# Patient Record
Sex: FEMALE | Race: BLACK OR AFRICAN AMERICAN | NOT HISPANIC OR LATINO
[De-identification: names, ages, dates, MRNs, and addresses within clinical notes are randomized per-mention and may not be internally consistent; named-entity substitution may affect disease eponyms.]

---

## 2017-08-14 ENCOUNTER — APPOINTMENT (OUTPATIENT)
Dept: ULTRASOUND IMAGING | Facility: CLINIC | Age: 22
End: 2017-08-14

## 2017-11-15 ENCOUNTER — APPOINTMENT (OUTPATIENT)
Dept: ULTRASOUND IMAGING | Facility: IMAGING CENTER | Age: 22
End: 2017-11-15
Payer: MEDICAID

## 2017-11-15 ENCOUNTER — OUTPATIENT (OUTPATIENT)
Dept: OUTPATIENT SERVICES | Facility: HOSPITAL | Age: 22
LOS: 1 days | End: 2017-11-15
Payer: MEDICAID

## 2017-11-15 DIAGNOSIS — Z12.31 ENCOUNTER FOR SCREENING MAMMOGRAM FOR MALIGNANT NEOPLASM OF BREAST: ICD-10-CM

## 2017-11-15 DIAGNOSIS — R92.2 INCONCLUSIVE MAMMOGRAM: ICD-10-CM

## 2017-11-15 DIAGNOSIS — Z00.8 ENCOUNTER FOR OTHER GENERAL EXAMINATION: ICD-10-CM

## 2017-11-15 PROCEDURE — 76641 ULTRASOUND BREAST COMPLETE: CPT

## 2017-11-15 PROCEDURE — 76641 ULTRASOUND BREAST COMPLETE: CPT | Mod: 26,50

## 2017-12-12 ENCOUNTER — OUTPATIENT (OUTPATIENT)
Dept: OUTPATIENT SERVICES | Facility: HOSPITAL | Age: 22
LOS: 1 days | End: 2017-12-12
Payer: MEDICAID

## 2017-12-12 ENCOUNTER — APPOINTMENT (OUTPATIENT)
Dept: ULTRASOUND IMAGING | Facility: IMAGING CENTER | Age: 22
End: 2017-12-12
Payer: MEDICAID

## 2017-12-12 ENCOUNTER — RESULT REVIEW (OUTPATIENT)
Age: 22
End: 2017-12-12

## 2017-12-12 DIAGNOSIS — Z00.8 ENCOUNTER FOR OTHER GENERAL EXAMINATION: ICD-10-CM

## 2017-12-12 PROCEDURE — 88305 TISSUE EXAM BY PATHOLOGIST: CPT

## 2017-12-12 PROCEDURE — 19083 BX BREAST 1ST LESION US IMAG: CPT | Mod: LT

## 2017-12-12 PROCEDURE — 19084 BX BREAST ADD LESION US IMAG: CPT | Mod: LT

## 2017-12-12 PROCEDURE — 88305 TISSUE EXAM BY PATHOLOGIST: CPT | Mod: 26

## 2017-12-12 PROCEDURE — 19083 BX BREAST 1ST LESION US IMAG: CPT

## 2018-02-06 ENCOUNTER — APPOINTMENT (OUTPATIENT)
Dept: ORTHOPEDIC SURGERY | Facility: CLINIC | Age: 23
End: 2018-02-06
Payer: MEDICAID

## 2018-02-06 PROCEDURE — 99203 OFFICE O/P NEW LOW 30 MIN: CPT

## 2018-02-06 PROCEDURE — 73030 X-RAY EXAM OF SHOULDER: CPT | Mod: LT

## 2018-02-06 RX ORDER — NAPROXEN 500 MG/1
500 TABLET ORAL TWICE DAILY
Qty: 60 | Refills: 0 | Status: COMPLETED | COMMUNITY
Start: 2018-02-06

## 2018-07-03 ENCOUNTER — APPOINTMENT (OUTPATIENT)
Dept: ORTHOPEDIC SURGERY | Facility: CLINIC | Age: 23
End: 2018-07-03

## 2018-07-11 ENCOUNTER — APPOINTMENT (OUTPATIENT)
Dept: ORTHOPEDIC SURGERY | Facility: CLINIC | Age: 23
End: 2018-07-11
Payer: MEDICAID

## 2018-07-11 DIAGNOSIS — S46.912D STRAIN OF UNSPECIFIED MUSCLE, FASCIA AND TENDON AT SHOULDER AND UPPER ARM LEVEL, LEFT ARM, SUBSEQUENT ENCOUNTER: ICD-10-CM

## 2018-07-11 PROCEDURE — 99213 OFFICE O/P EST LOW 20 MIN: CPT

## 2018-07-20 ENCOUNTER — APPOINTMENT (OUTPATIENT)
Dept: ORTHOPEDIC SURGERY | Facility: CLINIC | Age: 23
End: 2018-07-20

## 2023-02-26 ENCOUNTER — EMERGENCY (EMERGENCY)
Facility: HOSPITAL | Age: 28
LOS: 1 days | Discharge: HOME | End: 2023-02-26
Attending: EMERGENCY MEDICINE
Payer: SELF-PAY

## 2023-02-26 VITALS
HEART RATE: 97 BPM | TEMPERATURE: 98 F | WEIGHT: 164.91 LBS | HEIGHT: 65 IN | DIASTOLIC BLOOD PRESSURE: 89 MMHG | SYSTOLIC BLOOD PRESSURE: 134 MMHG | RESPIRATION RATE: 15 BRPM | OXYGEN SATURATION: 100 %

## 2023-02-26 VITALS
RESPIRATION RATE: 18 BRPM | DIASTOLIC BLOOD PRESSURE: 82 MMHG | HEART RATE: 89 BPM | OXYGEN SATURATION: 98 % | TEMPERATURE: 99 F | SYSTOLIC BLOOD PRESSURE: 117 MMHG

## 2023-02-26 LAB
ALBUMIN SERPL ELPH-MCNC: 5.3 G/DL — HIGH (ref 3.3–5)
ALP SERPL-CCNC: 83 U/L — SIGNIFICANT CHANGE UP (ref 40–120)
ALT FLD-CCNC: 35 U/L — SIGNIFICANT CHANGE UP (ref 10–45)
ANION GAP SERPL CALC-SCNC: 12 MMOL/L — SIGNIFICANT CHANGE UP (ref 5–17)
AST SERPL-CCNC: 21 U/L — SIGNIFICANT CHANGE UP (ref 10–40)
BASOPHILS # BLD AUTO: 0.02 K/UL — SIGNIFICANT CHANGE UP (ref 0–0.2)
BASOPHILS NFR BLD AUTO: 0.2 % — SIGNIFICANT CHANGE UP (ref 0–2)
BILIRUB SERPL-MCNC: 0.7 MG/DL — SIGNIFICANT CHANGE UP (ref 0.2–1.2)
BUN SERPL-MCNC: 9 MG/DL — SIGNIFICANT CHANGE UP (ref 7–23)
CALCIUM SERPL-MCNC: 10.1 MG/DL — SIGNIFICANT CHANGE UP (ref 8.4–10.5)
CHLORIDE SERPL-SCNC: 103 MMOL/L — SIGNIFICANT CHANGE UP (ref 96–108)
CO2 SERPL-SCNC: 22 MMOL/L — SIGNIFICANT CHANGE UP (ref 22–31)
CREAT SERPL-MCNC: 0.61 MG/DL — SIGNIFICANT CHANGE UP (ref 0.5–1.3)
EGFR: 126 ML/MIN/1.73M2 — SIGNIFICANT CHANGE UP
EOSINOPHIL # BLD AUTO: 0.13 K/UL — SIGNIFICANT CHANGE UP (ref 0–0.5)
EOSINOPHIL NFR BLD AUTO: 1.6 % — SIGNIFICANT CHANGE UP (ref 0–6)
GLUCOSE SERPL-MCNC: 102 MG/DL — HIGH (ref 70–99)
HCT VFR BLD CALC: 34.3 % — LOW (ref 34.5–45)
HGB BLD-MCNC: 11.5 G/DL — SIGNIFICANT CHANGE UP (ref 11.5–15.5)
IMM GRANULOCYTES NFR BLD AUTO: 0.2 % — SIGNIFICANT CHANGE UP (ref 0–0.9)
LYMPHOCYTES # BLD AUTO: 1.84 K/UL — SIGNIFICANT CHANGE UP (ref 1–3.3)
LYMPHOCYTES # BLD AUTO: 22.1 % — SIGNIFICANT CHANGE UP (ref 13–44)
MCHC RBC-ENTMCNC: 29.5 PG — SIGNIFICANT CHANGE UP (ref 27–34)
MCHC RBC-ENTMCNC: 33.5 GM/DL — SIGNIFICANT CHANGE UP (ref 32–36)
MCV RBC AUTO: 87.9 FL — SIGNIFICANT CHANGE UP (ref 80–100)
MONOCYTES # BLD AUTO: 0.48 K/UL — SIGNIFICANT CHANGE UP (ref 0–0.9)
MONOCYTES NFR BLD AUTO: 5.8 % — SIGNIFICANT CHANGE UP (ref 2–14)
NEUTROPHILS # BLD AUTO: 5.84 K/UL — SIGNIFICANT CHANGE UP (ref 1.8–7.4)
NEUTROPHILS NFR BLD AUTO: 70.1 % — SIGNIFICANT CHANGE UP (ref 43–77)
NRBC # BLD: 0 /100 WBCS — SIGNIFICANT CHANGE UP (ref 0–0)
PLATELET # BLD AUTO: 251 K/UL — SIGNIFICANT CHANGE UP (ref 150–400)
POTASSIUM SERPL-MCNC: 3.9 MMOL/L — SIGNIFICANT CHANGE UP (ref 3.5–5.3)
POTASSIUM SERPL-SCNC: 3.9 MMOL/L — SIGNIFICANT CHANGE UP (ref 3.5–5.3)
PROT SERPL-MCNC: 8.4 G/DL — HIGH (ref 6–8.3)
RAPID RVP RESULT: SIGNIFICANT CHANGE UP
RBC # BLD: 3.9 M/UL — SIGNIFICANT CHANGE UP (ref 3.8–5.2)
RBC # FLD: 12 % — SIGNIFICANT CHANGE UP (ref 10.3–14.5)
S PYO AG SPEC QL IA: NEGATIVE — SIGNIFICANT CHANGE UP
SARS-COV-2 RNA SPEC QL NAA+PROBE: SIGNIFICANT CHANGE UP
SODIUM SERPL-SCNC: 137 MMOL/L — SIGNIFICANT CHANGE UP (ref 135–145)
WBC # BLD: 8.33 K/UL — SIGNIFICANT CHANGE UP (ref 3.8–10.5)
WBC # FLD AUTO: 8.33 K/UL — SIGNIFICANT CHANGE UP (ref 3.8–10.5)

## 2023-02-26 PROCEDURE — 99283 EMERGENCY DEPT VISIT LOW MDM: CPT

## 2023-02-26 PROCEDURE — 80053 COMPREHEN METABOLIC PANEL: CPT

## 2023-02-26 PROCEDURE — 87880 STREP A ASSAY W/OPTIC: CPT

## 2023-02-26 PROCEDURE — 87081 CULTURE SCREEN ONLY: CPT

## 2023-02-26 PROCEDURE — 0225U NFCT DS DNA&RNA 21 SARSCOV2: CPT

## 2023-02-26 PROCEDURE — 99284 EMERGENCY DEPT VISIT MOD MDM: CPT

## 2023-02-26 PROCEDURE — 85025 COMPLETE CBC W/AUTO DIFF WBC: CPT

## 2023-02-26 RX ORDER — NEOMYCIN/POLYMYXIN B/DEXAMETHA 0.1 %
1 SUSPENSION, DROPS(FINAL DOSAGE FORM)(ML) OPHTHALMIC (EYE) ONCE
Refills: 0 | Status: COMPLETED | OUTPATIENT
Start: 2023-02-26 | End: 2023-02-26

## 2023-02-26 RX ORDER — SODIUM CHLORIDE 9 MG/ML
1000 INJECTION, SOLUTION INTRAVENOUS ONCE
Refills: 0 | Status: DISCONTINUED | OUTPATIENT
Start: 2023-02-26 | End: 2023-02-26

## 2023-02-26 RX ADMIN — Medication 1 APPLICATION(S): at 16:30

## 2023-02-26 RX ADMIN — Medication 1 TABLET(S): at 15:20

## 2023-02-26 NOTE — ED PROVIDER NOTE - PHYSICAL EXAMINATION
NAD. VSS. Afebrile. +PERRL, EOMI without tender. +Left periorbital eryth, swelling, and warmth. Vision acuity-R; 20/20, L; 20/25. Normal ears. Normal throat without obvious swelling or exudate. Neck supple. Lungs clear. ABD soft, non tender. No cva tender. Neuro- intact.

## 2023-02-26 NOTE — ED PROVIDER NOTE - ATTENDING APP SHARED VISIT CONTRIBUTION OF CARE
Attending MD Meehan: I personally have seen and examined this patient.  NP note reviewed and agree on plan of care and except where noted.  See below for details.     Seen in [place] 34L    27F with no reported contributory PMH/PSH/Meds, no known drug allergies presents to the ED with bilateral eyelid swelling, L>>R.  Reports that she developed URI symptoms on Wednesday, starting with a sore throat, congestion, rhinorrhea.  Reports went to Urgent Care on Thursday and tested Covid negative.  Reports that since then the URI symptoms Attending MD Meehan: I personally have seen and examined this patient.  NP note reviewed and agree on plan of care and except where noted.  See below for details.     Seen in [facility] 34L    27F with no reported contributory PMH/PSH/Meds, no known drug allergies presents to the ED with bilateral eyelid swelling, L>>R.  Reports is a contact lens wearer but last used contact lenses on Friday.  Reports has had episodes of lid edema in the past, reports that typically she gets lid edema when she gets URIs.  Denies change in vision, double vision, sudden loss of vision.  Denies pain with EOM.  Denies purulence from eyes.  Reports that she developed URI symptoms on Wednesday, starting with a sore throat, congestion, rhinorrhea.  Reports went to Urgent Care on Thursday and tested Covid negative.  Reports that since then the URI symptoms have gotten worse and reports has had cramping intermittent abdominal pain, one emetic episode, nonbloody, nonbilious and three episodes of loose stools, nonbloody, nonblack since Friday.  Denies fevers, chills.  Denies earache.    Exam:   General: NAD  HENT: head NCAT, airway patent, no pharyngeal erythema  Eyes: PERRL, EOMI, confrontational VF full, Va as per NP documentation, no periorbital erythema on the R upper or lower lid, mild edema to R upper and lower lids, L upper and lower lids with mild erythema, +edema, no increased warmth for this MD, no tenderness to palpation of orbital rim, lid margins clear, no conjunctival injection, no corneal defect, fundus exam deferred  Lungs: lungs CTAB with good inspiratory effort, no wheezing, no rhonchi, no rales  Cardiac: +S1S2, no obvious m/r/g  GI: abdomen soft with +BS, NT, ND  : no CVAT  MSK: FROM at neck, no calf tenderness, swelling, erythema or warmth   Neuro: moving all extremities spontaneously, nonfocal  Psych: normal mood and affect      A/P: 27F with eyelid edema, likely in setting of URI, will evaluate for but not limited to viral illness, will obtain labs to eval for leukocytosis, will swab for strep, given eyelid edema on left, possibly early preseptal cellulitis, will discuss po vs topical ricki antibiotic with ophtho.  Patient verbally agreed to pictures being sent to Ophtho.

## 2023-02-26 NOTE — ED ADULT NURSE NOTE - OBJECTIVE STATEMENT
27y f pt c/o pain and swelling to eyes; pt states this sometimes happens after being sick; pt was tested at urgent care x 2 days ago for flu/covid; negative; pt states some nausea; a few episodes of vomiting; no fever/chills; no cough; aox3; no resp distress; no sob; no chest pain; no vision changes; do diarrhea; no dysuria/hematuria; no blood in stool; iv placed; labs drawn/sent; pt refused fluid iv

## 2023-02-26 NOTE — ED ADULT TRIAGE NOTE - CHIEF COMPLAINT QUOTE
left eye swelling since Wednesday  pt gets this after getting sick usually resolves  ans some to the right no vision changes  pt has throat pain resolving

## 2023-02-26 NOTE — ED PROVIDER NOTE - NSFOLLOWUPCLINICS_GEN_ALL_ED_FT
[facility]hthal[facility]  Ophthalmology  [address] [place] [address], [facility] [address]  [place] [address]  Phone: [phone]  Fax:

## 2023-02-26 NOTE — ED PROVIDER NOTE - PROGRESS NOTE DETAILS
Attending MD Meehan: Discussed case with and sent pics to Dr. Haywood who recommends Augmentin po and Maxitrol ricki BID.  Agree with no imaging at this time.  Patient information given.  Will have patient follow up with Ophthalmology clinic.  Will apply ointment.  Stable for discharge. Follow up instructions given, importance of follow up emphasized, return to ED parameters reviewed and patient verbalized understanding.  All questions answered, all concerns addressed.

## 2023-02-26 NOTE — ED PROVIDER NOTE - PATIENT PORTAL LINK FT
You can access the FollowMyHealth Patient Portal offered by [facility] by registering at the following website: [email or web address]. By joining "[employer]’s FollowMyHealth portal, you will also be able to view your health information using other applications (apps) compatible with our system.

## 2023-02-26 NOTE — ED PROVIDER NOTE - NSFOLLOWUPINSTRUCTIONS_ED_ALL_ED_FT
Please see the information of preseptal cellulitis and viral syndrome.    Warm compression to left eye.    Take Augmentin as prescribed.    Maxitrol eye ointment to left eye ever 12 hours.    Take Tylenol (2 tablets of 500mg every 8hours) or/and Ibuprofen (600mg every 8hours with food) as needed for pain.    Follow up with eye clinic tomorrow for reevaluation, call [phone] in AM for appointment.                                                 Preseptal Cellulitis, Adult    Preseptal cellulitis is an infection of the eyelid and the tissues around the eye (periorbital area). The infection causes painful swelling and redness. This condition may also be called periorbital cellulitis.    In most cases, the condition can be treated with antibiotic medicine at home. It is important to treat preseptal cellulitis right away so that it does not get worse. If it gets worse, it can spread to the eye socket and eye muscles (orbital cellulitis). Orbital cellulitis is a medical emergency.    What are the causes?    Preseptal cellulitis is most commonly caused by bacteria. In rare cases, it can be caused by a virus or fungus. The germs that cause preseptal cellulitis may come from:  •A sinus infection that spreads near the eyes.  •An injury near the eye, such as a scratch, puncture wound, animal bite, or insect bite.  •A skin rash, such as eczema or poison ivy, that becomes infected.  •An infected pimple on the eyelid (stye).  •Infection after eyelid surgery or injury.    What increases the risk?    You are more likely to develop this condition if:  •You have a weakened disease-fighting system (immune system).  •You have a medical condition that raises your risk for sinus infections, such as nasal polyps.    What are the signs or symptoms?     Symptoms of this condition include:  •Eyelids that are red and swollen and feel unusually hot.  •Fever.  •Difficulty opening the eye.  •Headache.  •Pain in the face.    Symptoms of this condition usually develop suddenly.    How is this diagnosed?    This condition may be diagnosed based on your symptoms, your medical history, and an eye exam. You may also have tests, such as:  •Blood tests.  •Tests (cultures) to find out which specific bacteria are causing the infection. You may have a culture of any open wound or drainage.  •CT scan.  •MRI. This is less common.    How is this treated?    This condition is treated with antibiotic medicines. These may be given by mouth (orally), through an IV, or as an injection. In rare cases, you may need surgery to drain an infected area.    Follow these instructions at home:    Medicines     •Take your antibiotic medicine as told by your health care provider. Do not stop taking the antibiotic even if you start to feel better  •Take over-the-counter and prescription medicines only as told by your health care provider.    Eye Care   • Do not use eye drops without first getting approval from your health care provider.  • Do not touch or rub your eye. If you wear contact lenses, do not wear them until your health care provider approves.  •Keep the eye area clean and dry.  •Wash the eye area with a clean washcloth, warm water, and baby shampoo or mild soap.  •To help relieve discomfort, place a clean washcloth that is wet with warm water over your eye. Leave the washcloth on for a few minutes, then remove it.    General instructions   •Wash your hands with soap and water often for at least 20 seconds. If soap and water are not available, use hand .  • Do not use any products that contain nicotine or tobacco, such as cigarettes, e-cigarettes, and chewing tobacco. If you need help quitting, ask your health care provider.  •Drink enough fluid to keep your urine pale yellow.  • Do not drive or operate machinery until your health care provider says that it is safe. Ask your health care provider if it is safe for you to drive.  •Stay up to date on your vaccinations.  •Keep all follow-up visits. This includes any visits with an eye specialist (ophthalmologist) or dentist. This is important.    Get help right away if:  •You have new symptoms.  •Your symptoms get worse or do not get better with treatment.  •You have a fever.  •Your vision becomes blurry or gets worse in any way.  •Your eye looks like it is sticking out or bulging out (proptosis).  •You develop double vision.  •You have trouble moving your eyes or pain when moving your eyes  •You have a severe headache.  •You have neck stiffness or severe neck pain.    These symptoms may represent a serious problem that is an emergency. Do not wait to see if the symptoms will go away. Get medical help right away. Call your local emergency services (040 in the [place] Do not drive yourself to the hospital.     Summary  •Preseptal cellulitis is an infection of the eyelid and the tissues around the eye.  •Symptoms of preseptal cellulitis usually develop suddenly and include red and swollen eyelids, fever, difficulty opening the eye, headache, and facial pain.  •This condition is treated with antibiotic medicines. Do not stop taking the antibiotic even if you start to feel better.  •Preseptal cellulitis can develop into orbital cellulitis, which is a medical emergency. If your condition does not improve or worsens, visit your [facility] care provider right away.    This information is not intended to replace advice given to you by your health care provider. Make sure you discuss any questions you have with your health care provider.

## 2023-02-26 NOTE — ED ADULT NURSE NOTE - NSIMPLEMENTINTERV_GEN_ALL_ED
Implemented All Universal Safety Interventions:  [place] to call system. Call bell, personal items and telephone within reach. Instruct patient to call for assistance. Room bathroom lighting operational. Non-slip footwear when patient is off stretcher. Physically safe environment: no spills, clutter or unnecessary equipment. Stretcher in lowest position, wheels locked, appropriate side rails in place.

## 2023-02-26 NOTE — ED PROVIDER NOTE - OBJECTIVE STATEMENT
26yo female pt, no significant PMHx, c/o b/l eyelid swelling and red since Friday. Reports she had cold symptoms, congestion, sore throat, and cough, Thursday and evaluated by UC Thursday. Rapid COVID and Strep came back negative. States she had eyelid swelling/redness often with cold symptoms previously but noticed worsening symptoms since Friday. She wears contact lenses (last lens were Friday) and glasses. She also felt mild itching and used Benadryl cream without improvement. 28yo female pt, no significant PMHx, c/o b/l eyelid swelling and red since Friday. Reports she had cold symptoms, congestion, sore throat, and cough, Thursday and evaluated by UC Thursday. Rapid COVID and Strep came back negative. States she had eyelid swelling/redness often with cold symptoms previously but noticed worsening symptoms since Friday. She wears contact lenses (last lens were Friday) and glasses. She also felt mild itching and used Benadryl cream without improvement. She also c/o intermittent abd pain and vomitingx1 and diarrhea x3 since Friday. Denies difficult swallowing. Denies blood in vomitus or stools. Denies fever, chills, bodyahce or ear pain. Denies sensory changes or weakness to extremities. Denies urinary symptoms.

## 2023-02-26 NOTE — ED PROVIDER NOTE - DISPOSITION TYPE
-hx of neck pain, hx of cervicalgia reported back in DR, completed physical therapies and followed with orthopedics as per patient at that time  -neck x ray ordered  -physical examination remarkable for neck muscle tension  -trial with robaxin prn at night for muscle spasm DISCHARGE

## 2023-08-09 ENCOUNTER — APPOINTMENT (OUTPATIENT)
Dept: INTERNAL MEDICINE | Facility: CLINIC | Age: 28
End: 2023-08-09
Payer: COMMERCIAL

## 2023-08-09 VITALS
OXYGEN SATURATION: 99 % | WEIGHT: 162 LBS | SYSTOLIC BLOOD PRESSURE: 124 MMHG | TEMPERATURE: 97.9 F | DIASTOLIC BLOOD PRESSURE: 79 MMHG | HEART RATE: 79 BPM | BODY MASS INDEX: 26.99 KG/M2 | HEIGHT: 65 IN

## 2023-08-09 DIAGNOSIS — R25.2 CRAMP AND SPASM: ICD-10-CM

## 2023-08-09 DIAGNOSIS — Z00.00 ENCOUNTER FOR GENERAL ADULT MEDICAL EXAMINATION W/OUT ABNORMAL FINDINGS: ICD-10-CM

## 2023-08-09 PROCEDURE — G0444 DEPRESSION SCREEN ANNUAL: CPT | Mod: 59

## 2023-08-09 PROCEDURE — 99385 PREV VISIT NEW AGE 18-39: CPT | Mod: 25

## 2023-08-09 PROCEDURE — 93000 ELECTROCARDIOGRAM COMPLETE: CPT | Mod: 1L,59

## 2023-08-09 NOTE — ASSESSMENT
[FreeTextEntry1] : *Immunizations  COVID -19 TOTAL 2 VACCINE   LAST DOSE 2022 Influenza   declines Pneumococcal - N/A  TDAP - 2020 GARDASIL :   declines shingles vaccine N/A EKG done for HCM at this office today;  NSR no acute ST-T changes Lab ( venipuncture ) done today at this office Preventive medicine discussed - including importance of lifestyle modification -  with incorporation of healthy diet,  recommended Diet low fat diet + regular exercise Depression screening WITH the Patient Health Questionnaire-2 (PHQ-2), THE RESULT WAS NEGATIVE. The benefits of adequate calcium intake and routine daily cardiovascular exercise were reviewed with the patient.  The patient was informed regarding the benefits of a YEARLY screening FOR SKIN CANCER  -Recommended following up with dermatology for annual skin surveillance. -Recommended following up with GYN for annual surveillance. Recommended following up with dental, opthalmology  The importance of safer-sex was discussed with the patient.  DISCUSSED PRECAUTIONS AGAINST COVID19, INCLUDING MASK WEARING, SOCIAL DISTANCING AND HAND WASHING. -Follow up in 1 year for CPE / annual exam or PRN. All questions and concerns were discussed.

## 2023-08-09 NOTE — HISTORY OF PRESENT ILLNESS
[FreeTextEntry1] : HERE FOR Comprehensive annual examination  [de-identified] : BREAST MASS WAS BEGINE AND WAS REMOVED AT AGE 20 YRS  F/U BREAST SP LEFT LEG PAIN F/U BY ORTH

## 2023-08-09 NOTE — HEALTH RISK ASSESSMENT
[Good] : ~his/her~  mood as  good [Yes] : Yes [Monthly or less (1 pt)] : Monthly or less (1 point) [1 or 2 (0 pts)] : 1 or 2 (0 points) [Never (0 pts)] : Never (0 points) [No falls in past year] : Patient reported no falls in the past year [0] : 2) Feeling down, depressed, or hopeless: Not at all (0) [PHQ-2 Negative - No further assessment needed] : PHQ-2 Negative - No further assessment needed [Patient reported PAP Smear was normal] : Patient reported PAP Smear was normal [HIV test declined] : HIV test declined [Hepatitis C test declined] : Hepatitis C test declined [Change in mental status noted] : No change in mental status noted [Language] : denies difficulty with language [Handling Complex Tasks] : denies difficulty handling complex tasks [None] : None [With Family] : lives with family [Employed] : employed [Single] : single [Sexually Active] : sexually active [Fully functional (bathing, dressing, toileting, transferring, walking, feeding)] : Fully functional (bathing, dressing, toileting, transferring, walking, feeding) [Fully functional (using the telephone, shopping, preparing meals, housekeeping, doing laundry, using] : Fully functional and needs no help or supervision to perform IADLs (using the telephone, shopping, preparing meals, housekeeping, doing laundry, using transportation, managing medications and managing finances) [Reports changes in hearing] : Reports no changes in hearing [Reports changes in vision] : Reports no changes in vision [Reports changes in dental health] : Reports no changes in dental health [Seat Belt] :  uses seat belt [Sunscreen] : uses sunscreen [MammogramComments] : REF GIVEN [PapSmearDate] : 2021 [PapSmearComments] : PER PT [de-identified] : LAST EYE EXAM 7/2023 [de-identified] : LAST DENTAL EXAM 11/2018 [With Patient/Caregiver] : , with patient/caregiver [AdvancecareDate] : 08/2023 [Never] : Never

## 2023-08-11 ENCOUNTER — APPOINTMENT (OUTPATIENT)
Dept: ULTRASOUND IMAGING | Facility: IMAGING CENTER | Age: 28
End: 2023-08-11

## 2024-07-18 DIAGNOSIS — M25.519 PAIN IN UNSPECIFIED SHOULDER: ICD-10-CM

## 2024-08-07 ENCOUNTER — APPOINTMENT (OUTPATIENT)
Dept: INTERNAL MEDICINE | Facility: CLINIC | Age: 29
End: 2024-08-07

## 2024-08-07 PROBLEM — N63.0 BREAST MASS IN FEMALE: Status: ACTIVE | Noted: 2024-08-07

## 2024-08-07 PROBLEM — Z11.3 SCREEN FOR STD (SEXUALLY TRANSMITTED DISEASE): Status: ACTIVE | Noted: 2024-08-07

## 2024-08-07 PROBLEM — E55.9 VITAMIN D DEFICIENCY: Status: ACTIVE | Noted: 2024-08-07

## 2024-08-07 PROCEDURE — 99395 PREV VISIT EST AGE 18-39: CPT

## 2024-08-07 PROCEDURE — 36415 COLL VENOUS BLD VENIPUNCTURE: CPT

## 2024-08-07 NOTE — HISTORY OF PRESENT ILLNESS
[FreeTextEntry1] : HERE FOR Comprehensive annual examination  [de-identified] : BREAST MASS WAS BEGINE AND WAS REMOVED AT AGE 20 YRS  F/U BREAST SP LEFT LEG PAIN F/U BY ORTH

## 2024-08-07 NOTE — PHYSICAL EXAM
[Normal] : affect was normal and insight and judgment were intact [Normal Appearance] : normal in appearance [No Masses] : no palpable masses [No Nipple Discharge] : no nipple discharge [No Axillary Lymphadenopathy] : no axillary lymphadenopathy

## 2024-08-07 NOTE — HEALTH RISK ASSESSMENT
[Good] : ~his/her~  mood as  good [Yes] : Yes [Monthly or less (1 pt)] : Monthly or less (1 point) [1 or 2 (0 pts)] : 1 or 2 (0 points) [Never (0 pts)] : Never (0 points) [No falls in past year] : Patient reported no falls in the past year [0] : 2) Feeling down, depressed, or hopeless: Not at all (0) [PHQ-2 Negative - No further assessment needed] : PHQ-2 Negative - No further assessment needed [Never] : Never [Patient reported PAP Smear was normal] : Patient reported PAP Smear was normal [HIV test declined] : HIV test declined [Hepatitis C test declined] : Hepatitis C test declined [None] : None [With Family] : lives with family [Employed] : employed [Single] : single [Sexually Active] : sexually active [Fully functional (bathing, dressing, toileting, transferring, walking, feeding)] : Fully functional (bathing, dressing, toileting, transferring, walking, feeding) [Fully functional (using the telephone, shopping, preparing meals, housekeeping, doing laundry, using] : Fully functional and needs no help or supervision to perform IADLs (using the telephone, shopping, preparing meals, housekeeping, doing laundry, using transportation, managing medications and managing finances) [Seat Belt] :  uses seat belt [Sunscreen] : uses sunscreen [With Patient/Caregiver] : , with patient/caregiver [Change in mental status noted] : No change in mental status noted [Language] : denies difficulty with language [Handling Complex Tasks] : denies difficulty handling complex tasks [Reports changes in hearing] : Reports no changes in hearing [Reports changes in vision] : Reports no changes in vision [Reports changes in dental health] : Reports no changes in dental health [MammogramComments] : REF GIVEN [PapSmearDate] : 2021 [PapSmearComments] : PER PT [de-identified] : LAST EYE EXAM 7/2023 [de-identified] : LAST DENTAL EXAM 11/2018 [AdvancecareDate] : 08/2023

## 2024-08-07 NOTE — ASSESSMENT
[FreeTextEntry1] : *Immunizations  COVID -19 TOTAL 2 VACCINE   LAST DOSE 2022 Influenza   declines Pneumococcal - N/A  TDAP - 2020 GARDASIL :   declines shingles vaccine N/A Lab ( venipuncture ) done today at this office Preventive medicine discussed - including importance of lifestyle modification -  with incorporation of healthy diet,  recommended Diet low fat diet + regular exercise Depression screening WITH the Patient Health Questionnaire-2 (PHQ-2), THE RESULT WAS NEGATIVE. The benefits of adequate calcium intake and routine daily cardiovascular exercise were reviewed with the patient.  The patient was informed regarding the benefits of a YEARLY screening FOR SKIN CANCER  -Recommended following up with dermatology for annual skin surveillance. -Recommended following up with GYN for annual surveillance. Recommended following up with dental, opthalmology  The importance of safer-sex was discussed with the patient.  DISCUSSED PRECAUTIONS AGAINST COVID19, INCLUDING MASK WEARING, SOCIAL DISTANCING AND HAND WASHING. -Follow up in 1 year for CPE / annual exam or PRN. All questions and concerns were discussed.

## 2024-08-08 ENCOUNTER — NON-APPOINTMENT (OUTPATIENT)
Age: 29
End: 2024-08-08

## 2024-08-08 PROBLEM — M16.10 HIP ARTHRITIS: Status: ACTIVE | Noted: 2024-08-08

## 2024-08-08 PROBLEM — M25.561 KNEE PAIN, BILATERAL: Status: ACTIVE | Noted: 2024-08-08

## 2024-08-08 PROBLEM — R05.3 CHRONIC COUGH: Status: ACTIVE | Noted: 2024-08-07

## 2024-08-08 PROBLEM — M25.559 ACUTE HIP PAIN, UNSPECIFIED LATERALITY: Status: ACTIVE | Noted: 2024-08-08 | Resolved: 2024-09-07

## 2025-01-02 ENCOUNTER — APPOINTMENT (OUTPATIENT)
Dept: INTERNAL MEDICINE | Facility: CLINIC | Age: 30
End: 2025-01-02
Payer: COMMERCIAL

## 2025-01-02 VITALS
DIASTOLIC BLOOD PRESSURE: 81 MMHG | BODY MASS INDEX: 26.99 KG/M2 | OXYGEN SATURATION: 99 % | WEIGHT: 162 LBS | HEIGHT: 65 IN | HEART RATE: 79 BPM | SYSTOLIC BLOOD PRESSURE: 130 MMHG

## 2025-01-02 DIAGNOSIS — R59.0 LOCALIZED ENLARGED LYMPH NODES: ICD-10-CM

## 2025-01-02 DIAGNOSIS — J20.9 ACUTE BRONCHITIS, UNSPECIFIED: ICD-10-CM

## 2025-01-02 PROCEDURE — 99213 OFFICE O/P EST LOW 20 MIN: CPT

## 2025-01-03 RX ORDER — AMOXICILLIN AND CLAVULANATE POTASSIUM 875; 125 MG/1; MG/1
875-125 TABLET, COATED ORAL
Qty: 14 | Refills: 0 | Status: ACTIVE | COMMUNITY
Start: 2025-01-02 | End: 1900-01-01

## 2025-01-07 LAB
INFLUENZA A RESULT: NOT DETECTED
INFLUENZA B RESULT: NOT DETECTED
RESP SYN VIRUS RESULT: DETECTED
S PYO DNA THROAT QL NAA+PROBE: NOT DETECTED
SARS-COV-2 RESULT: NOT DETECTED

## 2025-01-28 ENCOUNTER — APPOINTMENT (OUTPATIENT)
Dept: INTERNAL MEDICINE | Facility: CLINIC | Age: 30
End: 2025-01-28

## 2025-02-12 ENCOUNTER — APPOINTMENT (OUTPATIENT)
Dept: INTERNAL MEDICINE | Facility: CLINIC | Age: 30
End: 2025-02-12
Payer: COMMERCIAL

## 2025-02-12 VITALS
HEART RATE: 78 BPM | DIASTOLIC BLOOD PRESSURE: 78 MMHG | WEIGHT: 162 LBS | OXYGEN SATURATION: 100 % | BODY MASS INDEX: 26.99 KG/M2 | HEIGHT: 65 IN | SYSTOLIC BLOOD PRESSURE: 140 MMHG

## 2025-02-12 DIAGNOSIS — R59.0 LOCALIZED ENLARGED LYMPH NODES: ICD-10-CM

## 2025-02-12 PROCEDURE — 99213 OFFICE O/P EST LOW 20 MIN: CPT

## 2025-02-14 LAB
ALBUMIN SERPL ELPH-MCNC: 4.9 G/DL
ALP BLD-CCNC: 61 U/L
ALT SERPL-CCNC: 12 U/L
ANION GAP SERPL CALC-SCNC: 10 MMOL/L
AST SERPL-CCNC: 17 U/L
BASOPHILS # BLD AUTO: 0.02 K/UL
BASOPHILS NFR BLD AUTO: 0.4 %
BILIRUB SERPL-MCNC: 0.6 MG/DL
BUN SERPL-MCNC: 10 MG/DL
CALCIUM SERPL-MCNC: 9.9 MG/DL
CHLORIDE SERPL-SCNC: 105 MMOL/L
CO2 SERPL-SCNC: 24 MMOL/L
CREAT SERPL-MCNC: 0.74 MG/DL
EGFR: 113 ML/MIN/1.73M2
EOSINOPHIL # BLD AUTO: 0.16 K/UL
EOSINOPHIL NFR BLD AUTO: 3.6 %
GLUCOSE SERPL-MCNC: 85 MG/DL
HCT VFR BLD CALC: 36.8 %
HGB BLD-MCNC: 11.6 G/DL
IMM GRANULOCYTES NFR BLD AUTO: 0.2 %
LYMPHOCYTES # BLD AUTO: 1.88 K/UL
LYMPHOCYTES NFR BLD AUTO: 41.8 %
MAN DIFF?: NORMAL
MCHC RBC-ENTMCNC: 29.4 PG
MCHC RBC-ENTMCNC: 31.5 G/DL
MCV RBC AUTO: 93.4 FL
MONOCYTES # BLD AUTO: 0.29 K/UL
MONOCYTES NFR BLD AUTO: 6.4 %
NEUTROPHILS # BLD AUTO: 2.14 K/UL
NEUTROPHILS NFR BLD AUTO: 47.6 %
PLATELET # BLD AUTO: 274 K/UL
POTASSIUM SERPL-SCNC: 4.1 MMOL/L
PROT SERPL-MCNC: 8 G/DL
RBC # BLD: 3.94 M/UL
RBC # FLD: 13.3 %
SODIUM SERPL-SCNC: 139 MMOL/L
WBC # FLD AUTO: 4.5 K/UL

## 2025-02-17 ENCOUNTER — APPOINTMENT (OUTPATIENT)
Dept: ULTRASOUND IMAGING | Facility: CLINIC | Age: 30
End: 2025-02-17
Payer: COMMERCIAL

## 2025-02-17 ENCOUNTER — OUTPATIENT (OUTPATIENT)
Dept: OUTPATIENT SERVICES | Facility: HOSPITAL | Age: 30
LOS: 1 days | End: 2025-02-17
Payer: COMMERCIAL

## 2025-02-17 DIAGNOSIS — R59.0 LOCALIZED ENLARGED LYMPH NODES: ICD-10-CM

## 2025-02-17 PROCEDURE — 76536 US EXAM OF HEAD AND NECK: CPT

## 2025-02-17 PROCEDURE — 76536 US EXAM OF HEAD AND NECK: CPT | Mod: 26

## 2025-03-11 ENCOUNTER — OUTPATIENT (OUTPATIENT)
Dept: OUTPATIENT SERVICES | Facility: HOSPITAL | Age: 30
LOS: 1 days | End: 2025-03-11
Payer: COMMERCIAL

## 2025-03-11 ENCOUNTER — APPOINTMENT (OUTPATIENT)
Dept: ULTRASOUND IMAGING | Facility: CLINIC | Age: 30
End: 2025-03-11
Payer: COMMERCIAL

## 2025-03-11 ENCOUNTER — RESULT REVIEW (OUTPATIENT)
Age: 30
End: 2025-03-11

## 2025-03-11 DIAGNOSIS — Z00.8 ENCOUNTER FOR OTHER GENERAL EXAMINATION: ICD-10-CM

## 2025-03-11 PROCEDURE — 76641 ULTRASOUND BREAST COMPLETE: CPT

## 2025-03-11 PROCEDURE — 76641 ULTRASOUND BREAST COMPLETE: CPT | Mod: 26,50

## 2025-08-21 ENCOUNTER — APPOINTMENT (OUTPATIENT)
Dept: INTERNAL MEDICINE | Facility: CLINIC | Age: 30
End: 2025-08-21